# Patient Record
Sex: MALE | Employment: FULL TIME | URBAN - METROPOLITAN AREA
[De-identification: names, ages, dates, MRNs, and addresses within clinical notes are randomized per-mention and may not be internally consistent; named-entity substitution may affect disease eponyms.]

---

## 2017-09-26 ENCOUNTER — HOSPITAL ENCOUNTER (OUTPATIENT)
Age: 57
Discharge: HOME | End: 2017-09-26
Payer: MEDICARE

## 2017-09-26 LAB — ALT SERPL-CCNC: 11 U/L (ref 5–41)

## 2017-09-26 PROCEDURE — 36415 COLL VENOUS BLD VENIPUNCTURE: CPT

## 2017-09-26 PROCEDURE — 84460 ALANINE AMINO (ALT) (SGPT): CPT

## 2021-11-09 ENCOUNTER — HOSPITAL ENCOUNTER (OUTPATIENT)
Dept: PULMONOLOGY | Age: 61
Discharge: HOME | End: 2021-11-09
Payer: MEDICARE

## 2021-11-09 DIAGNOSIS — R05.3 CHRONIC COUGH: ICD-10-CM

## 2021-11-09 LAB
DLCO %PRED: NORMAL
DLCO PRED: NORMAL
DLCO/VA %PRED: NORMAL
DLCO/VA PRED: NORMAL
DLCO/VA: NORMAL
DLCO: NORMAL
EXPIRATORY TIME: NORMAL
FEF 25-75% %PRED-PRE: NORMAL
FEF 25-75% PRED: NORMAL
FEF 25-75%-PRE: NORMAL
FEV1 %PRED-PRE: NORMAL
FEV1 PRED: NORMAL
FEV1/FVC %PRED-PRE: NORMAL
FEV1/FVC PRED: NORMAL
FEV1/FVC: NORMAL
FEV1: NORMAL
FVC %PRED-PRE: NORMAL
FVC PRED: NORMAL
FVC: NORMAL
GAW %PRED: NORMAL
GAW PRED: NORMAL
GAW: NORMAL
IC %PRED: NORMAL
IC PRED: NORMAL
IC: NORMAL
MVV %PRED-PRE: NORMAL
MVV PRED: NORMAL
MVV-PRE: NORMAL
PEF %PRED-PRE: NORMAL
PEF PRED: NORMAL
PEF-PRE: NORMAL
RAW %PRED: NORMAL
RAW PRED: NORMAL
RAW: NORMAL
RV %PRED: NORMAL
RV PRED: NORMAL
RV: NORMAL
SVC %PRED: NORMAL
SVC PRED: NORMAL
SVC: NORMAL
TLC %PRED: NORMAL
TLC PRED: NORMAL
TLC: NORMAL
VA %PRED: NORMAL
VA PRED: NORMAL
VA: NORMAL
VTG %PRED: NORMAL
VTG PRED: NORMAL
VTG: NORMAL

## 2021-11-09 PROCEDURE — 94726 PLETHYSMOGRAPHY LUNG VOLUMES: CPT

## 2021-11-09 PROCEDURE — 94729 DIFFUSING CAPACITY: CPT

## 2021-11-09 PROCEDURE — 94664 DEMO&/EVAL PT USE INHALER: CPT

## 2021-11-09 PROCEDURE — 94060 EVALUATION OF WHEEZING: CPT

## 2021-11-09 PROCEDURE — 94375 RESPIRATORY FLOW VOLUME LOOP: CPT

## 2021-12-21 DIAGNOSIS — M25.522 ELBOW PAIN, LEFT: Primary | ICD-10-CM

## 2021-12-23 ENCOUNTER — OFFICE VISIT (OUTPATIENT)
Dept: ORTHOPEDIC SURGERY | Age: 61
End: 2021-12-23
Payer: MEDICARE

## 2021-12-23 VITALS — HEIGHT: 69 IN

## 2021-12-23 DIAGNOSIS — M77.8 TRICEPS TENDONITIS: Primary | ICD-10-CM

## 2021-12-23 PROCEDURE — 3017F COLORECTAL CA SCREEN DOC REV: CPT | Performed by: STUDENT IN AN ORGANIZED HEALTH CARE EDUCATION/TRAINING PROGRAM

## 2021-12-23 PROCEDURE — 4004F PT TOBACCO SCREEN RCVD TLK: CPT | Performed by: STUDENT IN AN ORGANIZED HEALTH CARE EDUCATION/TRAINING PROGRAM

## 2021-12-23 PROCEDURE — G8427 DOCREV CUR MEDS BY ELIG CLIN: HCPCS | Performed by: STUDENT IN AN ORGANIZED HEALTH CARE EDUCATION/TRAINING PROGRAM

## 2021-12-23 PROCEDURE — G8484 FLU IMMUNIZE NO ADMIN: HCPCS | Performed by: STUDENT IN AN ORGANIZED HEALTH CARE EDUCATION/TRAINING PROGRAM

## 2021-12-23 PROCEDURE — G8421 BMI NOT CALCULATED: HCPCS | Performed by: STUDENT IN AN ORGANIZED HEALTH CARE EDUCATION/TRAINING PROGRAM

## 2021-12-23 PROCEDURE — 99213 OFFICE O/P EST LOW 20 MIN: CPT | Performed by: STUDENT IN AN ORGANIZED HEALTH CARE EDUCATION/TRAINING PROGRAM

## 2021-12-23 NOTE — PROGRESS NOTES
[facility]  [facility]  [address] [facility]  Dept: [phone]    Ambulatory Orthopedic Clinic    CHIEF COMPLAINT:    Chief Complaint   Patient presents with    New Patient     Had injury at home about 2 years ago getting out of bed. Has been bothersome since        HISTORY OF PRESENT ILLNESS:      The patient is a 64 y.o. male who is being seen for evaluation of chronic left elbow pain which been present for approximately 4 years now. Patient states that he initially noticed symptoms when he bumped his elbow while getting up from bed. Since then he has had intermittent pain primarily over his triceps insertion over his posterior olecranon. Denies any numbness or tingling or radiating symptoms. Pain today is a 1/10, though occasionally patient states that his arm is so painful he is unable to use it. He has undergone physical therapy in the past for his left elbow without relief. He does not take anything for pain. He has not had steroid injections. Past Medical History:    History reviewed. No pertinent past medical history. Past Surgical History:    History reviewed. No pertinent surgical history. Current Medications:   No current outpatient medications on file. No current facility-administered medications for this visit. Allergies:    Patient has no known allergies.     Social History:   Social History     Socioeconomic History    Marital status:      Spouse name: Not on file    Number of children: Not on file    Years of education: Not on file    Highest education level: Not on file   Occupational History    Not on file   Tobacco Use    Smoking status: Not on file    Smokeless tobacco: Not on file   Substance and Sexual Activity    Alcohol use: Not on file    Drug use: Not on file    Sexual activity: Not on file   Other Topics Concern    Not on file   Social History Narrative    Not on file     Social Determinants of Health     Financial Resource Strain:     Difficulty of Paying Living Expenses: Not on file   Food Insecurity:     Worried About Running Out of Food in the Last Year: Not on file    Aubree of Food in the Last Year: Not on file   Transportation Needs:     Lack of Transportation (Medical): Not on file    Lack of Transportation (Non-Medical): Not on file   Physical Activity:     Days of Exercise per Week: Not on file    Minutes of Exercise per Session: Not on file   Stress:     Feeling of Stress : Not on file   Social Connections:     Frequency of Communication with Friends and Family: Not on file    Frequency of Social Gatherings with Friends and Family: Not on file    Attends [employer] Services: Not on file    Active Member of [address] [place] [address] [employer] or Organizations: Not on file    Attends Club or Organization Meetings: Not on file    Marital Status: Not on file   Intimate Partner Violence:     Fear of Current or Ex-Partner: Not on file    Emotionally Abused: Not on file    Physically Abused: Not on file    Sexually Abused: Not on file   Housing Stability:     Unable to Pay for Housing in the Last Year: Not on file    Number of [place] in the Last Year: Not on file    Unstable Housing in the Last Year: Not on file       Family History:  History reviewed. No pertinent family history. REVIEW OF SYSTEMS:  Constitutional: Negative for fever and chills. Respiratory: Negative for cough, shortness of breath and wheezing. Cardiovascular: Negative for chest pain and palpitations. GI: Denies any nausea, vomiting, abdominal pain   Musculoskeletal: Positive for left elbow pain    PHYSICAL EXAM:  Ht 5' 9\" (1.753 m)     General: Piter Olvera is a 64 y.o. male who is alert and oriented and sitting comfortably in our office. Neuro: alert. oriented  Eyes: Extra-ocular muscles intact  Mouth: Oral mucosa moist. No perioral lesions  Pulm: Respirations unlabored and regular.   Skin: warm, well perfused  Psych:   Patient has good fund of knowledge and displays understanging of exam, diagnosis, and plan. MSK: Left elbow: Very mild tenderness palpation over the triceps insertion with some mild crepitance within the tendon. Full elbow range of motion. Strength 5/5 with resisted elbow extension without pain. Tinel's at the ulnar nerve. Compartments soft. 2+ rad pulse. Median/Radial/Ulnar/AIN/PIN motor intact. Median/Radial/Ulnar nerve SILT. Radiology:   History:   Left elbow pain    Findings:   Views: AP/oblique/lateral left elbow  Findings: Demonstrate a small osteophyte at the triceps tendon insertion. Mild elbow arthritis with what appears to be a small osseous fragment at the [facility] insertion, centrally from a previous injury. No lytic or blastic lesions. No fractures noted. Normal joint alignment. Previous comparison films: None    Impression:  1. Triceps insertional tendinitis      ASSESSMENT:     1. Triceps tendonitis         PLAN:  Discussed etiologies and natural histories of triceps insertional tendinitis. The treatment options may include anti inflammatories, bracing, injections further imaging, and last resort surgery. At this point because the patient has minimal symptoms he has elected for no additional treatment today. He is hesitant to take any NSAIDs. He does not want a steroid injection. Recommend that if patient has worsening symptoms which do not resolve with ice or NSAIDs to call our office and we can order an MRI of his left elbow for further evaluation. Patient is in agreement to this plan. Call the office immediately with any problems. No orders of the defined types were placed in this encounter. No orders of the defined types were placed in this encounter.            --------------------------------------------------------------  Rianna Farrar DO, PGY-5  [facility]) Orthopedic Surgery   12:22 PM 12/23/21      Please excuse any typos/errors, as this note was created with the assistance of voice recognition software. While intending to generate a document that actually reflects the content of the visit, the document can still have some errors including those of syntax and sound-a-like substitutions which may escape proof reading. In such instances, actual meaning can be extrapolated by context.

## 2023-01-06 ENCOUNTER — HOSPITAL ENCOUNTER (EMERGENCY)
Age: 63
Discharge: HOME | End: 2023-01-06

## 2023-01-06 VITALS
RESPIRATION RATE: 18 BRPM | OXYGEN SATURATION: 98 % | TEMPERATURE: 98.3 F | SYSTOLIC BLOOD PRESSURE: 129 MMHG | DIASTOLIC BLOOD PRESSURE: 93 MMHG | HEART RATE: 69 BPM

## 2024-05-16 NOTE — PROGRESS NOTES
Dermatology Patient Note  [facility], [facility] DERMATOLOGY  [address]  [address]  [facility] [address]  Dept: [phone]  Dept Fax: [phone]      VISITDATE: 5/17/2024   REFERRING PROVIDER: No ref. provider found      Rangel Steiner is a 64 y.o. male  who presents today in the office for:    New Patient (New pt present for a possible rash on bilateral arms/hands that has been present for a few years now. States that he has not tried anything OTC topicals for it, it just flakes off. No other sxs associated with it. )      HISTORY OF PRESENT ILLNESS:  Patient presents to the office today as a new patient to establish care. He is present for scaling on bilateral arms and hands that has been present for a few years. He denies itching. This worsens in the winter. He states he has not tried any OTC topicals.    MEDICAL PROBLEMS:  There are no problems to display for this patient.      CURRENT MEDICATIONS:   Current Outpatient Medications   Medication Sig Dispense Refill    ammonium lactate (AMLACTIN) 12 % cream Apply to dry skin areas 1-2 times daily. 385 g 4     No current facility-administered medications for this visit.       ALLERGIES:   No Known Allergies    SOCIAL HISTORY:  Social History     Tobacco Use    Smoking status: Never    Smokeless tobacco: Never   Substance Use Topics    Alcohol use: Not on file       Pertinent ROS:  Review of Systems  Skin: Denies any new changing, growing or bleeding lesions or rashes except as described in the HPI   Constitutional: Denies fevers, chills, and malaise.    PHYSICAL EXAM:   /78   Pulse 76   Temp 98.1 °F (36.7 °C)   Ht 1.753 m (5' 9\")   Wt 80.3 kg (177 lb)   SpO2 97%   BMI 26.14 kg/m²     The patient is generally well appearing, well nourished, alert and conversational. Affect is normal.    Cutaneous Exam:  Physical Exam  Focused exam of upper extremities and hands was

## 2024-05-17 ENCOUNTER — OFFICE VISIT (OUTPATIENT)
Dept: DERMATOLOGY | Age: 64
End: 2024-05-17
Payer: MEDICAID

## 2024-05-17 VITALS
OXYGEN SATURATION: 97 % | TEMPERATURE: 98.1 F | WEIGHT: 177 LBS | DIASTOLIC BLOOD PRESSURE: 78 MMHG | BODY MASS INDEX: 26.22 KG/M2 | HEART RATE: 76 BPM | HEIGHT: 69 IN | SYSTOLIC BLOOD PRESSURE: 130 MMHG

## 2024-05-17 DIAGNOSIS — L85.3 XEROSIS CUTIS: ICD-10-CM

## 2024-05-17 DIAGNOSIS — L82.1 SEBORRHEIC KERATOSES: Primary | ICD-10-CM

## 2024-05-17 PROCEDURE — 99204 OFFICE O/P NEW MOD 45 MIN: CPT | Performed by: PHYSICIAN ASSISTANT

## 2024-05-17 RX ORDER — AMMONIUM LACTATE 12 G/100G
CREAM TOPICAL
Qty: 385 G | Refills: 4 | Status: SHIPPED | OUTPATIENT
Start: 2024-05-17 | End: 2024-06-16

## 2024-05-17 NOTE — PATIENT INSTRUCTIONS
- Start Lac-Hydrin 12% cream; Apply topically 1-2 times daily  ________________________________________________________  Dry skin can flake, itch, crack, and even bleed. To help relieve dry skin, dermatologists offer these tips:    Keep baths and showers short. Use warm, not hot water, and a mild cleanser. Gently pat the skin dry.    Apply moisturizer after getting out of the bath or shower.Generally, creams and ointments are thicker and more effective than lotions.The best time to apply the moisturizer is right after bathing.  Moisturizers can be used several times during the day. Avoid moisturizers with scents.  Your dermatologist will recommend a plain moisturizer such as Eucerin Cream, Aquaphor Ointment, Vaseline, Cetaphil Cream, Vanicream, Aveeno Advanced Care or Cerave Cream    Read ingredients on skin care products. Deodorant soaps, alcohol-based toners, and products that contain fragrance can irritate dry, sensitive skin.    Use a humidifier to add much-needed moisture to the air.    Wear soft fabrics that breathe, such as 100 percent cotton. If you want to wear wool and other rough fabrics, wear a soft fabric underneath.    Apply hand cream after each hand washing. If more relief is needed, dab petroleum jelly on your hands before bed. If your hands are frequently immersed in water, wear waterproof gloves to help protect them.

## 2025-02-11 PROBLEM — M51.369 LUMBAR DISC NARROWING: Status: ACTIVE | Noted: 2021-02-18

## 2025-02-11 PROBLEM — M47.816 OSTEOARTHRITIS OF LUMBAR SPINE: Status: ACTIVE | Noted: 2021-02-18

## 2025-02-11 PROBLEM — M54.50 ACUTE MIDLINE LOW BACK PAIN WITHOUT SCIATICA: Status: ACTIVE | Noted: 2021-02-18

## 2025-02-11 PROBLEM — D35.01 BILATERAL ADRENAL ADENOMAS: Status: ACTIVE | Noted: 2021-04-26

## 2025-02-11 PROBLEM — D35.02 BILATERAL ADRENAL ADENOMAS: Status: ACTIVE | Noted: 2021-04-26

## 2025-02-11 PROBLEM — K59.00 CONSTIPATION: Status: ACTIVE | Noted: 2021-02-18

## 2025-02-11 NOTE — PROGRESS NOTES
[facility] Medicine Residency  [address] [place] [address]  Phone: [phone]  Fax: [phone]      Date of Visit: 2025  Patient Name: Rangel Steiner   Patient :  1960     ASSESSMENT/PLAN     1. Well adult exam  Assessment & Plan:  Have discussed all cause top for reasons for mortality age group.  Strongly recommended immunizations that patient will take under advisement.  Will also give information on sleep apnea and know the signs and symptoms to look for.    Orders:  -     Comprehensive Metabolic Panel; Future  -     CBC with Auto Differential; Future  2. Screening cholesterol level  Assessment & Plan:  Baseline screening for cholesterol is indicated every 3 to 5 years because of the significant risk of heart disease cause of death.    Orders:  -     Lipid, Fasting; Future  3. Screening for prostate cancer  Assessment & Plan:  Screening for prostate cancer indicated.  No nocturnal symptoms low risk individual.    Orders:  -     PSA Screening; Future  4. Bilateral adrenal adenomas  Assessment & Plan:  Typically no further x-rays are done for the lesions found on your adrenal glands but annual blood work is indicated.  If there are changes on the blood work that would suggest at that point in time I would consider repeat MRI.    Orders:  -     Lipid, Fasting; Future  -     Comprehensive Metabolic Panel; Future     Physical exam wise you appear to be doing well.  The only thing that jumped out on physical exam is that you clearly are at risk based on the way your mouth and throat are shaped is the potential to developing sleep apnea.  Snoring is not sleep apnea.  Sleep apnea can present with cardiac rhythm problems or with inability to wake refreshed after a reasonable night sleep.    Top for causes of death are heart disease, cancer, lung disease, stroke.  For this reason we routinely do cancer screening as well as check cholesterol and

## 2025-02-12 ENCOUNTER — OFFICE VISIT (OUTPATIENT)
Age: 65
End: 2025-02-12
Payer: MEDICAID

## 2025-02-12 VITALS
BODY MASS INDEX: 24.87 KG/M2 | TEMPERATURE: 98.3 F | RESPIRATION RATE: 12 BRPM | HEIGHT: 72 IN | WEIGHT: 183.6 LBS | HEART RATE: 65 BPM | DIASTOLIC BLOOD PRESSURE: 75 MMHG | SYSTOLIC BLOOD PRESSURE: 133 MMHG

## 2025-02-12 DIAGNOSIS — Z13.220 SCREENING CHOLESTEROL LEVEL: ICD-10-CM

## 2025-02-12 DIAGNOSIS — D35.01 BILATERAL ADRENAL ADENOMAS: ICD-10-CM

## 2025-02-12 DIAGNOSIS — Z12.5 SCREENING FOR PROSTATE CANCER: ICD-10-CM

## 2025-02-12 DIAGNOSIS — D35.02 BILATERAL ADRENAL ADENOMAS: ICD-10-CM

## 2025-02-12 DIAGNOSIS — Z00.00 WELL ADULT EXAM: Primary | ICD-10-CM

## 2025-02-12 PROCEDURE — 99212 OFFICE O/P EST SF 10 MIN: CPT | Performed by: FAMILY MEDICINE

## 2025-02-12 SDOH — SOCIAL STABILITY: SOCIAL NETWORK: ARE YOU MARRIED, WIDOWED, DIVORCED, SEPARATED, NEVER MARRIED, OR LIVING WITH A PARTNER?: MARRIED

## 2025-02-12 SDOH — ECONOMIC STABILITY: FOOD INSECURITY: WITHIN THE PAST 12 MONTHS, YOU WORRIED THAT YOUR FOOD WOULD RUN OUT BEFORE YOU GOT MONEY TO BUY MORE.: NEVER TRUE

## 2025-02-12 SDOH — SOCIAL STABILITY: SOCIAL INSECURITY: WITHIN THE LAST YEAR, HAVE YOU BEEN AFRAID OF YOUR PARTNER OR EX-PARTNER?: NO

## 2025-02-12 SDOH — ECONOMIC STABILITY: FOOD INSECURITY: WITHIN THE PAST 12 MONTHS, THE FOOD YOU BOUGHT JUST DIDN'T LAST AND YOU DIDN'T HAVE MONEY TO GET MORE.: NEVER TRUE

## 2025-02-12 SDOH — HEALTH STABILITY: MENTAL HEALTH: HOW OFTEN DO YOU HAVE A DRINK CONTAINING ALCOHOL?: 2-4 TIMES A MONTH

## 2025-02-12 SDOH — SOCIAL STABILITY: SOCIAL NETWORK: HOW OFTEN DO YOU ATTEND CHURCH OR RELIGIOUS SERVICES?: NEVER

## 2025-02-12 SDOH — SOCIAL STABILITY: SOCIAL INSECURITY
WITHIN THE LAST YEAR, HAVE YOU BEEN KICKED, HIT, SLAPPED, OR OTHERWISE PHYSICALLY HURT BY YOUR PARTNER OR EX-PARTNER?: NO

## 2025-02-12 SDOH — SOCIAL STABILITY: SOCIAL INSECURITY: WITHIN THE LAST YEAR, HAVE YOU BEEN HUMILIATED OR EMOTIONALLY ABUSED IN OTHER WAYS BY YOUR PARTNER OR EX-PARTNER?: NO

## 2025-02-12 SDOH — HEALTH STABILITY: PHYSICAL HEALTH: ON AVERAGE, HOW MANY DAYS PER WEEK DO YOU ENGAGE IN MODERATE TO STRENUOUS EXERCISE (LIKE A BRISK WALK)?: 0 DAYS

## 2025-02-12 SDOH — SOCIAL STABILITY: SOCIAL NETWORK
DO YOU BELONG TO ANY CLUBS OR ORGANIZATIONS SUCH AS CHURCH GROUPS UNIONS, FRATERNAL OR ATHLETIC GROUPS, OR SCHOOL GROUPS?: NO

## 2025-02-12 SDOH — HEALTH STABILITY: MENTAL HEALTH
STRESS IS WHEN SOMEONE FEELS TENSE, NERVOUS, ANXIOUS, OR CAN'T SLEEP AT NIGHT BECAUSE THEIR MIND IS TROUBLED. HOW STRESSED ARE YOU?: NOT AT ALL

## 2025-02-12 SDOH — SOCIAL STABILITY: SOCIAL INSECURITY
WITHIN THE LAST YEAR, HAVE TO BEEN RAPED OR FORCED TO HAVE ANY KIND OF SEXUAL ACTIVITY BY YOUR PARTNER OR EX-PARTNER?: NO

## 2025-02-12 SDOH — HEALTH STABILITY: MENTAL HEALTH: HOW MANY STANDARD DRINKS CONTAINING ALCOHOL DO YOU HAVE ON A TYPICAL DAY?: 1 OR 2

## 2025-02-12 SDOH — ECONOMIC STABILITY: INCOME INSECURITY: IN THE LAST 12 MONTHS, WAS THERE A TIME WHEN YOU WERE NOT ABLE TO PAY THE MORTGAGE OR RENT ON TIME?: NO

## 2025-02-12 SDOH — ECONOMIC STABILITY: TRANSPORTATION INSECURITY
IN THE PAST 12 MONTHS, HAS LACK OF TRANSPORTATION KEPT YOU FROM MEETINGS, WORK, OR FROM GETTING THINGS NEEDED FOR DAILY LIVING?: NO

## 2025-02-12 SDOH — SOCIAL STABILITY: SOCIAL NETWORK: HOW OFTEN DO YOU GET TOGETHER WITH FRIENDS OR RELATIVES?: NEVER

## 2025-02-12 SDOH — HEALTH STABILITY: PHYSICAL HEALTH: ON AVERAGE, HOW MANY MINUTES DO YOU ENGAGE IN EXERCISE AT THIS LEVEL?: 0 MIN

## 2025-02-12 SDOH — ECONOMIC STABILITY: INCOME INSECURITY: HOW HARD IS IT FOR YOU TO PAY FOR THE VERY BASICS LIKE FOOD, HOUSING, MEDICAL CARE, AND HEATING?: NOT HARD AT ALL

## 2025-02-12 SDOH — SOCIAL STABILITY: SOCIAL NETWORK
IN A TYPICAL WEEK, HOW MANY TIMES DO YOU TALK ON THE PHONE WITH FAMILY, FRIENDS, OR NEIGHBORS?: MORE THAN THREE TIMES A WEEK

## 2025-02-12 SDOH — ECONOMIC STABILITY: TRANSPORTATION INSECURITY
IN THE PAST 12 MONTHS, HAS THE LACK OF TRANSPORTATION KEPT YOU FROM MEDICAL APPOINTMENTS OR FROM GETTING MEDICATIONS?: NO

## 2025-02-12 SDOH — SOCIAL STABILITY: SOCIAL NETWORK: HOW OFTEN DO YOU ATTENT MEETINGS OF THE CLUB OR ORGANIZATION YOU BELONG TO?: NEVER

## 2025-02-12 ASSESSMENT — PATIENT HEALTH QUESTIONNAIRE - PHQ9
SUM OF ALL RESPONSES TO PHQ QUESTIONS 1-9: 0
1. LITTLE INTEREST OR PLEASURE IN DOING THINGS: NOT AT ALL
2. FEELING DOWN, DEPRESSED OR HOPELESS: NOT AT ALL
SUM OF ALL RESPONSES TO PHQ QUESTIONS 1-9: 0
SUM OF ALL RESPONSES TO PHQ9 QUESTIONS 1 & 2: 0

## 2025-02-12 NOTE — ASSESSMENT & PLAN NOTE
Baseline screening for cholesterol is indicated every 3 to 5 years because of the significant risk of heart disease cause of death.

## 2025-02-12 NOTE — ASSESSMENT & PLAN NOTE
Detail Level: Detailed Typically no further x-rays are done for the lesions found on your adrenal glands but annual blood work is indicated.  If there are changes on the blood work that would suggest at that point in time I would consider repeat MRI.     Consent: The patient's consent was obtained including but not limited to risks of crusting, scabbing, blistering, scarring, darker or lighter pigmentary change, recurrence, incomplete removal and infection. Anesthesia Type: 1% lidocaine with epinephrine and a 1:10 solution of 8.4% sodium bicarbonate Price (Use Numbers Only, No Special Characters Or $): 50.00 Anesthesia Volume In Cc: 0.5 Post-Care Instructions: I reviewed with the patient in detail post-care instructions. Patient is to wear sunprotection, and avoid picking at any of the treated lesions. Pt may apply Vaseline to crusted or scabbing areas.

## 2025-02-12 NOTE — PATIENT INSTRUCTIONS
Physical exam wise you appear to be doing well.  The only thing that jumped out on physical exam is that you clearly are at risk based on the way your mouth and throat are shaped is the potential to developing sleep apnea.  Snoring is not sleep apnea.  Sleep apnea can present with cardiac rhythm problems or with inability to wake refreshed after a reasonable night sleep.    Top for causes of death are heart disease, cancer, lung disease, stroke.  For this reason we routinely do cancer screening as well as check cholesterol and routinely check at least once a year blood sugar levels.  If the blood sugar levels slightly high then we check what is called an A1c.    I understand your reluctance to fix things that are not broken!  However immunizations really are effective particularly tetanus and influenza as well as pneumococcal.  Pneumococcal disease in the form of pneumococcal pneumonia used to be the #1 cause of death in people over age 65 until we started immunizing people it is no longer the #1 cause of death over 65.  However is still prominent because of pneumonia in people over 65.    The 1 I really think you should consider would be a shingles vaccination because shingles in someone that can be very very painful for a long time.    Will obtain some fasting blood work and then I like to see you back sometime in the next 6 to 9 months if possible and see how you are doing if you have any questions with the results of your lab tests by all means schedule appointment to be seen or call signed up for Neva and I as the test results come back I will put my assessment in so you can see what I am thinking.      It was nice seeing you today.  Remember some of the things we talked about.  Most importantly be gentle to yourself.      Remember these 4 components of wellness:    1.  Restoration: This includes time to restore ones energy.  This includes a minimum of 7-8 quality hours of sleep at night.  Passing out

## 2025-02-12 NOTE — ASSESSMENT & PLAN NOTE
Have discussed all cause top for reasons for mortality age group.  Strongly recommended immunizations that patient will take under advisement.  Will also give information on sleep apnea and know the signs and symptoms to look for.

## 2025-03-14 PROBLEM — Z12.5 SCREENING FOR PROSTATE CANCER: Status: RESOLVED | Noted: 2025-02-12 | Resolved: 2025-03-14

## 2025-03-14 PROBLEM — Z00.00 WELL ADULT EXAM: Status: RESOLVED | Noted: 2025-02-12 | Resolved: 2025-03-14

## 2025-03-14 PROBLEM — Z13.220 SCREENING CHOLESTEROL LEVEL: Status: RESOLVED | Noted: 2025-02-12 | Resolved: 2025-03-14

## 2025-05-12 ENCOUNTER — TELEPHONE (OUTPATIENT)
Age: 65
End: 2025-05-12

## 2025-05-12 DIAGNOSIS — R07.9 CHEST PAIN, UNSPECIFIED TYPE: Primary | ICD-10-CM

## 2025-05-12 NOTE — TELEPHONE ENCOUNTER
I reviewed the ER note and did not see where they had told him he need to follow-up with the cardiologist I did not see where he was told he needed follow-up with us.  I have no problems making referral to a cardiologist but he really does need to see a cardiologist unless he has an abnormal stress thallium test personally I would prefer to just schedule him for stress thallium test and based upon that decide then if he needs to see cardiology.  Other than age he does not have any substantial risk factors and most likely chest pain awaking at night is more likely to be esophageal spasm than cardiac in nature.      I will order stress thallium test which can be done as an outpatient if there is anything abnormal then we can make a referral for him to see a cardiologist for possible cath.  For now lets plan on getting the testing and then follow-up appointment to discuss the results.  Watches acid intake and nonsteroidal use.

## 2025-05-12 NOTE — TELEPHONE ENCOUNTER
Patient was seen in the ER yesterday for chest pain and he was told to call his PCP to get a referral to see a cardiologist. Patient was told that he needed to an ER follow up but patient refused.

## 2025-06-05 ENCOUNTER — TELEPHONE (OUTPATIENT)
Age: 65
End: 2025-06-05

## 2025-06-05 NOTE — TELEPHONE ENCOUNTER
Patient called to because he was seen in the ER on 5/11 for chest pain/shortness of breath and he wanted to do a follow up appointment for this today because he stated that he is still having shortness of breath. Patient was told that we had no more available appointments this and that he can be scheduled to come in tomorrow and patient was not satisfied with that answer and wanted to be seen today, patient was told that we only see patient in the morning on Thursday's and that he would have come in tomorrow. He stated that he called back to the ER and ask them if he should go back to the ER and he was told to follow up with his PCP. Patient is now calling back and asking for a referral to see a cardiologist and he was told that he had to do a follow up appointment first to get the referral and he is upset because he does not want to wait to be seen. Patient ask to speak to a nurse and was put on hold to get a hold of a nurse and then he hung up.

## 2025-06-05 NOTE — TELEPHONE ENCOUNTER
I spoke to patient and he states he is having chest pain today, comes and goes. Told him he needs to go to the ER for treatment and evaluation, and would need a follow up here in the office, verbalizes understatnding.

## 2025-06-09 ENCOUNTER — HOSPITAL ENCOUNTER (OUTPATIENT)
Age: 65
Discharge: HOME | End: 2025-06-11
Attending: FAMILY MEDICINE
Payer: MEDICARE

## 2025-06-09 ENCOUNTER — HOSPITAL ENCOUNTER (OUTPATIENT)
Dept: NUCLEAR MEDICINE | Age: 65
Discharge: HOME | End: 2025-06-11
Attending: FAMILY MEDICINE
Payer: MEDICARE

## 2025-06-09 DIAGNOSIS — R07.9 CHEST PAIN, UNSPECIFIED TYPE: ICD-10-CM

## 2025-06-09 LAB
STRESS BASELINE DIAS BP: 95 MMHG
STRESS BASELINE HR: 76 BPM
STRESS BASELINE SYS BP: 127 MMHG
STRESS ESTIMATED WORKLOAD: 1 METS
STRESS PEAK DIAS BP: 99 MMHG
STRESS PEAK SYS BP: 153 MMHG
STRESS PERCENT HR ACHIEVED: 65 %
STRESS POST PEAK HR: 101 BPM
STRESS RATE PRESSURE PRODUCT: NORMAL BPM*MMHG
STRESS ST DEPRESSION: 0 MM
STRESS TARGET HR: 155 BPM

## 2025-06-09 PROCEDURE — 2500000003 HC RX 250 WO HCPCS: Performed by: FAMILY MEDICINE

## 2025-06-09 PROCEDURE — 3430000000 HC RX DIAGNOSTIC RADIOPHARMACEUTICAL: Performed by: FAMILY MEDICINE

## 2025-06-09 PROCEDURE — 93017 CV STRESS TEST TRACING ONLY: CPT

## 2025-06-09 PROCEDURE — 6360000002 HC RX W HCPCS: Performed by: FAMILY MEDICINE

## 2025-06-09 PROCEDURE — 78452 HT MUSCLE IMAGE SPECT MULT: CPT

## 2025-06-09 PROCEDURE — A9500 TC99M SESTAMIBI: HCPCS | Performed by: FAMILY MEDICINE

## 2025-06-09 RX ORDER — METOPROLOL TARTRATE 1 MG/ML
5 INJECTION, SOLUTION INTRAVENOUS EVERY 5 MIN PRN
Status: ACTIVE | OUTPATIENT
Start: 2025-06-09 | End: 2025-06-09

## 2025-06-09 RX ORDER — ATROPINE SULFATE 0.1 MG/ML
0.5 INJECTION INTRAVENOUS EVERY 5 MIN PRN
Status: ACTIVE | OUTPATIENT
Start: 2025-06-09 | End: 2025-06-09

## 2025-06-09 RX ORDER — AMINOPHYLLINE 25 MG/ML
50 INJECTION, SOLUTION INTRAVENOUS PRN
Status: ACTIVE | OUTPATIENT
Start: 2025-06-09 | End: 2025-06-09

## 2025-06-09 RX ORDER — TETRAKIS(2-METHOXYISOBUTYLISOCYANIDE)COPPER(I) TETRAFLUOROBORATE 1 MG/ML
14.9 INJECTION, POWDER, LYOPHILIZED, FOR SOLUTION INTRAVENOUS
Status: COMPLETED | OUTPATIENT
Start: 2025-06-09 | End: 2025-06-09

## 2025-06-09 RX ORDER — SODIUM CHLORIDE 0.9 % (FLUSH) 0.9 %
10 SYRINGE (ML) INJECTION PRN
Status: DISCONTINUED | OUTPATIENT
Start: 2025-06-09 | End: 2025-06-12 | Stop reason: HOSPADM

## 2025-06-09 RX ORDER — ALBUTEROL SULFATE 90 UG/1
2 INHALANT RESPIRATORY (INHALATION) PRN
Status: ACTIVE | OUTPATIENT
Start: 2025-06-09 | End: 2025-06-09

## 2025-06-09 RX ORDER — REGADENOSON 0.08 MG/ML
0.4 INJECTION, SOLUTION INTRAVENOUS
Status: COMPLETED | OUTPATIENT
Start: 2025-06-09 | End: 2025-06-09

## 2025-06-09 RX ORDER — SODIUM CHLORIDE 0.9 % (FLUSH) 0.9 %
5-40 SYRINGE (ML) INJECTION PRN
Status: ACTIVE | OUTPATIENT
Start: 2025-06-09 | End: 2025-06-09

## 2025-06-09 RX ORDER — NITROGLYCERIN 0.4 MG/1
0.4 TABLET SUBLINGUAL EVERY 5 MIN PRN
Status: ACTIVE | OUTPATIENT
Start: 2025-06-09 | End: 2025-06-09

## 2025-06-09 RX ORDER — SODIUM CHLORIDE 9 MG/ML
500 INJECTION, SOLUTION INTRAVENOUS CONTINUOUS PRN
Status: ACTIVE | OUTPATIENT
Start: 2025-06-09 | End: 2025-06-09

## 2025-06-09 RX ORDER — TETRAKIS(2-METHOXYISOBUTYLISOCYANIDE)COPPER(I) TETRAFLUOROBORATE 1 MG/ML
39.8 INJECTION, POWDER, LYOPHILIZED, FOR SOLUTION INTRAVENOUS
Status: COMPLETED | OUTPATIENT
Start: 2025-06-09 | End: 2025-06-09

## 2025-06-09 RX ADMIN — TETRAKIS(2-METHOXYISOBUTYLISOCYANIDE)COPPER(I) TETRAFLUOROBORATE 14.9 MILLICURIE: 1 INJECTION, POWDER, LYOPHILIZED, FOR SOLUTION INTRAVENOUS at 08:49

## 2025-06-09 RX ADMIN — REGADENOSON 0.4 MG: 0.08 INJECTION, SOLUTION INTRAVENOUS at 10:25

## 2025-06-09 RX ADMIN — SODIUM CHLORIDE, PRESERVATIVE FREE 10 ML: 5 INJECTION INTRAVENOUS at 09:57

## 2025-06-09 RX ADMIN — TETRAKIS(2-METHOXYISOBUTYLISOCYANIDE)COPPER(I) TETRAFLUOROBORATE 39.8 MILLICURIE: 1 INJECTION, POWDER, LYOPHILIZED, FOR SOLUTION INTRAVENOUS at 10:26

## 2025-06-09 RX ADMIN — SODIUM CHLORIDE, PRESERVATIVE FREE 10 ML: 5 INJECTION INTRAVENOUS at 10:26

## 2025-06-09 RX ADMIN — SODIUM CHLORIDE, PRESERVATIVE FREE 10 ML: 5 INJECTION INTRAVENOUS at 08:49

## 2025-06-10 ENCOUNTER — RESULTS FOLLOW-UP (OUTPATIENT)
Age: 65
End: 2025-06-10

## 2025-08-18 PROBLEM — R07.9 CHEST PAIN: Status: ACTIVE | Noted: 2025-05-11

## 2025-08-18 PROBLEM — R06.02 SOB (SHORTNESS OF BREATH): Status: ACTIVE | Noted: 2025-05-11

## 2025-08-19 ENCOUNTER — RESULTS FOLLOW-UP (OUTPATIENT)
Age: 65
End: 2025-08-19

## 2025-08-19 ENCOUNTER — OFFICE VISIT (OUTPATIENT)
Age: 65
End: 2025-08-19
Payer: COMMERCIAL

## 2025-08-19 ENCOUNTER — HOSPITAL ENCOUNTER (OUTPATIENT)
Age: 65
Discharge: HOME | End: 2025-08-19
Payer: COMMERCIAL

## 2025-08-19 VITALS
HEART RATE: 74 BPM | BODY MASS INDEX: 27.67 KG/M2 | TEMPERATURE: 97.7 F | RESPIRATION RATE: 16 BRPM | DIASTOLIC BLOOD PRESSURE: 69 MMHG | WEIGHT: 186.8 LBS | HEIGHT: 69 IN | SYSTOLIC BLOOD PRESSURE: 131 MMHG

## 2025-08-19 DIAGNOSIS — Z13.1 DIABETES MELLITUS SCREENING: ICD-10-CM

## 2025-08-19 DIAGNOSIS — Z23 NEED FOR PROPHYLACTIC VACCINATION AGAINST DIPHTHERIA-TETANUS-PERTUSSIS (DTP): ICD-10-CM

## 2025-08-19 DIAGNOSIS — Z72.89 OTHER PROBLEMS RELATED TO LIFESTYLE: ICD-10-CM

## 2025-08-19 DIAGNOSIS — Z11.59 NEED FOR HEPATITIS C SCREENING TEST: ICD-10-CM

## 2025-08-19 DIAGNOSIS — Z00.00 WELCOME TO MEDICARE PREVENTIVE VISIT: ICD-10-CM

## 2025-08-19 DIAGNOSIS — Z00.00 MEDICARE ANNUAL WELLNESS VISIT, INITIAL: Primary | ICD-10-CM

## 2025-08-19 DIAGNOSIS — Z23 NEED FOR PROPHYLACTIC VACCINATION AND INOCULATION AGAINST VARICELLA: ICD-10-CM

## 2025-08-19 DIAGNOSIS — Z12.11 COLON CANCER SCREENING: ICD-10-CM

## 2025-08-19 LAB
EST. AVERAGE GLUCOSE BLD GHB EST-MCNC: 108 MG/DL
HBA1C MFR BLD: 5.4 % (ref 4–6)
HCV AB SERPL QL IA: NONREACTIVE

## 2025-08-19 PROCEDURE — 83036 HEMOGLOBIN GLYCOSYLATED A1C: CPT

## 2025-08-19 PROCEDURE — 86803 HEPATITIS C AB TEST: CPT

## 2025-08-19 PROCEDURE — 93000 ELECTROCARDIOGRAM COMPLETE: CPT | Performed by: FAMILY MEDICINE

## 2025-08-19 PROCEDURE — 36415 COLL VENOUS BLD VENIPUNCTURE: CPT

## 2025-08-19 PROCEDURE — 1123F ACP DISCUSS/DSCN MKR DOCD: CPT | Performed by: FAMILY MEDICINE

## 2025-08-19 PROCEDURE — G0402 INITIAL PREVENTIVE EXAM: HCPCS | Performed by: FAMILY MEDICINE

## 2025-08-19 ASSESSMENT — LIFESTYLE VARIABLES
HOW OFTEN DURING THE LAST YEAR HAVE YOU FAILED TO DO WHAT WAS NORMALLY EXPECTED FROM YOU BECAUSE OF DRINKING: NEVER
HOW OFTEN DURING THE LAST YEAR HAVE YOU BEEN UNABLE TO REMEMBER WHAT HAPPENED THE NIGHT BEFORE BECAUSE YOU HAD BEEN DRINKING: NEVER
HOW OFTEN DO YOU HAVE A DRINK CONTAINING ALCOHOL: 2-4 TIMES A MONTH
HAVE YOU OR SOMEONE ELSE BEEN INJURED AS A RESULT OF YOUR DRINKING: NO
HOW OFTEN DURING THE LAST YEAR HAVE YOU NEEDED AN ALCOHOLIC DRINK FIRST THING IN THE MORNING TO GET YOURSELF GOING AFTER A NIGHT OF HEAVY DRINKING: NEVER
HOW MANY STANDARD DRINKS CONTAINING ALCOHOL DO YOU HAVE ON A TYPICAL DAY: 1 OR 2
HAS A RELATIVE, FRIEND, DOCTOR, OR ANOTHER HEALTH PROFESSIONAL EXPRESSED CONCERN ABOUT YOUR DRINKING OR SUGGESTED YOU CUT DOWN: NO
HOW OFTEN DURING THE LAST YEAR HAVE YOU HAD A FEELING OF GUILT OR REMORSE AFTER DRINKING: NEVER
HOW OFTEN DURING THE LAST YEAR HAVE YOU FOUND THAT YOU WERE NOT ABLE TO STOP DRINKING ONCE YOU HAD STARTED: NEVER

## 2025-08-19 ASSESSMENT — PATIENT HEALTH QUESTIONNAIRE - PHQ9
SUM OF ALL RESPONSES TO PHQ QUESTIONS 1-9: 0
2. FEELING DOWN, DEPRESSED OR HOPELESS: NOT AT ALL
1. LITTLE INTEREST OR PLEASURE IN DOING THINGS: NOT AT ALL
SUM OF ALL RESPONSES TO PHQ QUESTIONS 1-9: 0
2. FEELING DOWN, DEPRESSED OR HOPELESS: NOT AT ALL

## 2025-08-22 ENCOUNTER — TELEPHONE (OUTPATIENT)
Age: 65
End: 2025-08-22

## 2025-08-22 RX ORDER — BACLOFEN 10 MG/1
10 TABLET ORAL
Qty: 60 TABLET | Refills: 1 | Status: SHIPPED | OUTPATIENT
Start: 2025-08-22

## 2025-08-22 RX ORDER — MELOXICAM 15 MG/1
15 TABLET ORAL DAILY
Qty: 30 TABLET | Refills: 0 | Status: SHIPPED | OUTPATIENT
Start: 2025-08-22